# Patient Record
Sex: FEMALE | Race: WHITE | Employment: OTHER | URBAN - METROPOLITAN AREA
[De-identification: names, ages, dates, MRNs, and addresses within clinical notes are randomized per-mention and may not be internally consistent; named-entity substitution may affect disease eponyms.]

---

## 2017-01-20 ENCOUNTER — HOSPITAL ENCOUNTER (OUTPATIENT)
Dept: MAMMOGRAPHY | Age: 72
Discharge: HOME | End: 2017-01-20
Attending: NURSE PRACTITIONER
Payer: MEDICARE

## 2017-01-20 DIAGNOSIS — Z12.31 VISIT FOR SCREENING MAMMOGRAM: ICD-10-CM

## 2017-01-20 PROCEDURE — 77067 SCR MAMMO BI INCL CAD: CPT

## 2018-02-23 ENCOUNTER — HOSPITAL ENCOUNTER (OUTPATIENT)
Dept: MAMMOGRAPHY | Age: 73
Discharge: HOME | End: 2018-02-23
Attending: OBSTETRICS & GYNECOLOGY
Payer: MEDICARE

## 2018-02-23 DIAGNOSIS — Z12.31 VISIT FOR SCREENING MAMMOGRAM: ICD-10-CM

## 2018-02-23 PROCEDURE — 77067 SCR MAMMO BI INCL CAD: CPT

## 2018-11-01 ENCOUNTER — HOSPITAL ENCOUNTER (OUTPATIENT)
Dept: LAB | Age: 73
Discharge: HOME | End: 2018-11-01

## 2018-11-01 PROCEDURE — 88305 TISSUE EXAM BY PATHOLOGIST: CPT

## 2018-11-01 PROCEDURE — 88312 SPECIAL STAINS GROUP 1: CPT

## 2019-02-25 ENCOUNTER — HOSPITAL ENCOUNTER (OUTPATIENT)
Dept: MAMMOGRAPHY | Age: 74
Discharge: HOME | End: 2019-02-25
Attending: OBSTETRICS & GYNECOLOGY
Payer: MEDICARE

## 2019-02-25 DIAGNOSIS — Z12.31 VISIT FOR SCREENING MAMMOGRAM: ICD-10-CM

## 2019-02-25 PROCEDURE — 77067 SCR MAMMO BI INCL CAD: CPT

## 2020-03-04 ENCOUNTER — HOSPITAL ENCOUNTER (OUTPATIENT)
Dept: MAMMOGRAPHY | Age: 75
Discharge: HOME | End: 2020-03-04
Attending: NURSE PRACTITIONER
Payer: MEDICARE

## 2020-03-04 DIAGNOSIS — Z78.0 ASYMPTOMATIC MENOPAUSAL STATE: ICD-10-CM

## 2020-03-04 DIAGNOSIS — Z12.31 VISIT FOR SCREENING MAMMOGRAM: ICD-10-CM

## 2020-03-04 PROCEDURE — 77063 BREAST TOMOSYNTHESIS BI: CPT

## 2020-03-04 PROCEDURE — 77080 DXA BONE DENSITY AXIAL: CPT

## 2020-10-14 ENCOUNTER — HOSPITAL ENCOUNTER (OUTPATIENT)
Dept: LAB | Age: 75
Discharge: HOME | End: 2020-10-14

## 2020-10-14 PROCEDURE — 88305 TISSUE EXAM BY PATHOLOGIST: CPT

## 2020-12-08 ENCOUNTER — TRANSCRIBE ORDER (OUTPATIENT)
Dept: SCHEDULING | Age: 75
End: 2020-12-08

## 2020-12-08 DIAGNOSIS — Z12.31 SCREENING MAMMOGRAM FOR HIGH-RISK PATIENT: Primary | ICD-10-CM

## 2020-12-08 DIAGNOSIS — Z12.31 ENCOUNTER FOR SCREENING MAMMOGRAM FOR MALIGNANT NEOPLASM OF BREAST: Primary | ICD-10-CM

## 2021-04-12 ENCOUNTER — HOSPITAL ENCOUNTER (OUTPATIENT)
Dept: MAMMOGRAPHY | Age: 76
Discharge: HOME | End: 2021-04-12
Attending: NURSE PRACTITIONER
Payer: MEDICARE

## 2021-04-12 DIAGNOSIS — Z12.31 ENCOUNTER FOR SCREENING MAMMOGRAM FOR MALIGNANT NEOPLASM OF BREAST: ICD-10-CM

## 2021-04-12 PROCEDURE — 77063 BREAST TOMOSYNTHESIS BI: CPT

## 2021-08-09 ENCOUNTER — TRANSCRIBE ORDER (OUTPATIENT)
Dept: SCHEDULING | Age: 76
End: 2021-08-09

## 2021-08-09 DIAGNOSIS — K57.30 DVRTCLOS OF LG INT W/O PERFORATION OR ABSCESS W/O BLEEDING: Primary | ICD-10-CM

## 2021-08-09 DIAGNOSIS — R10.32 LLQ PAIN: ICD-10-CM

## 2021-08-09 DIAGNOSIS — R63.4 WEIGHT LOSS: ICD-10-CM

## 2021-08-20 ENCOUNTER — HOSPITAL ENCOUNTER (OUTPATIENT)
Dept: CT IMAGING | Age: 76
Discharge: HOME | End: 2021-08-20
Attending: INTERNAL MEDICINE
Payer: MEDICARE

## 2021-08-20 DIAGNOSIS — R63.4 WEIGHT LOSS: ICD-10-CM

## 2021-08-20 DIAGNOSIS — R10.32 LLQ PAIN: ICD-10-CM

## 2021-08-20 DIAGNOSIS — K57.30 DVRTCLOS OF LG INT W/O PERFORATION OR ABSCESS W/O BLEEDING: ICD-10-CM

## 2021-08-20 PROCEDURE — 74011000636 HC RX REV CODE- 636: Performed by: INTERNAL MEDICINE

## 2021-08-20 PROCEDURE — 74176 CT ABD & PELVIS W/O CONTRAST: CPT

## 2021-08-20 RX ADMIN — DIATRIZOATE MEGLUMINE AND DIATRIZOATE SODIUM 15 ML: 660; 100 LIQUID ORAL; RECTAL at 10:28

## 2021-08-31 ENCOUNTER — TRANSCRIBE ORDER (OUTPATIENT)
Dept: REGISTRATION | Age: 76
End: 2021-08-31

## 2021-08-31 DIAGNOSIS — Q61.00 CONGENITAL RENAL CYST: Primary | ICD-10-CM

## 2021-09-14 ENCOUNTER — HOSPITAL ENCOUNTER (OUTPATIENT)
Dept: CT IMAGING | Age: 76
Discharge: HOME | End: 2021-09-14
Attending: INTERNAL MEDICINE
Payer: MEDICARE

## 2021-09-14 DIAGNOSIS — Q61.00 CONGENITAL RENAL CYST: ICD-10-CM

## 2021-09-14 LAB — CREAT BLD-MCNC: 0.66 MG/DL (ref 0.8–1.5)

## 2021-09-14 PROCEDURE — 82565 ASSAY OF CREATININE: CPT

## 2021-09-14 PROCEDURE — 74011000258 HC RX REV CODE- 258: Performed by: INTERNAL MEDICINE

## 2021-09-14 PROCEDURE — 74170 CT ABD WO CNTRST FLWD CNTRST: CPT

## 2021-09-14 PROCEDURE — 74011000636 HC RX REV CODE- 636: Performed by: INTERNAL MEDICINE

## 2021-09-14 RX ORDER — SODIUM CHLORIDE 0.9 % (FLUSH) 0.9 %
10 SYRINGE (ML) INJECTION
Status: COMPLETED | OUTPATIENT
Start: 2021-09-14 | End: 2021-09-14

## 2021-09-14 RX ADMIN — SODIUM CHLORIDE 100 ML: 900 INJECTION, SOLUTION INTRAVENOUS at 09:35

## 2021-09-14 RX ADMIN — Medication 10 ML: at 09:35

## 2021-09-14 RX ADMIN — IOPAMIDOL 100 ML: 755 INJECTION, SOLUTION INTRAVENOUS at 09:34

## 2021-10-06 ENCOUNTER — TRANSCRIBE ORDER (OUTPATIENT)
Dept: SCHEDULING | Age: 76
End: 2021-10-06

## 2021-10-06 DIAGNOSIS — R10.32 LEFT LOWER QUADRANT PAIN: Primary | ICD-10-CM

## 2021-10-06 DIAGNOSIS — R11.0 NAUSEA: ICD-10-CM

## 2021-10-06 DIAGNOSIS — R93.429 ABNORMAL CT SCAN, KIDNEY: ICD-10-CM

## 2021-12-09 ENCOUNTER — TRANSCRIBE ORDER (OUTPATIENT)
Dept: SCHEDULING | Age: 76
End: 2021-12-09

## 2021-12-09 DIAGNOSIS — M85.80 OSTEOPENIA, UNSPECIFIED LOCATION: Primary | ICD-10-CM

## 2021-12-09 DIAGNOSIS — M81.0 OSTEOPOROSIS: ICD-10-CM

## 2021-12-09 DIAGNOSIS — Z13.820 OSTEOPOROSIS SCREENING: ICD-10-CM

## 2021-12-09 DIAGNOSIS — M85.80 OSTEOPENIA: ICD-10-CM

## 2021-12-09 DIAGNOSIS — M85.80 OSTEOPENIA, UNSPECIFIED LOCATION: ICD-10-CM

## 2021-12-09 DIAGNOSIS — Z12.31 BREAST CANCER SCREENING BY MAMMOGRAM: Primary | ICD-10-CM

## 2021-12-13 ENCOUNTER — TRANSCRIBE ORDER (OUTPATIENT)
Dept: SCHEDULING | Age: 76
End: 2021-12-13

## 2021-12-13 DIAGNOSIS — M81.0 POSTMENOPAUSAL OSTEOPOROSIS: Primary | ICD-10-CM

## 2022-04-14 ENCOUNTER — HOSPITAL ENCOUNTER (OUTPATIENT)
Dept: MAMMOGRAPHY | Age: 77
Discharge: HOME | End: 2022-04-14
Attending: NURSE PRACTITIONER
Payer: MEDICARE

## 2022-04-14 DIAGNOSIS — M81.0 POSTMENOPAUSAL OSTEOPOROSIS: ICD-10-CM

## 2022-04-14 DIAGNOSIS — Z12.31 BREAST CANCER SCREENING BY MAMMOGRAM: ICD-10-CM

## 2022-04-14 PROCEDURE — 77080 DXA BONE DENSITY AXIAL: CPT

## 2022-04-14 PROCEDURE — 77063 BREAST TOMOSYNTHESIS BI: CPT

## 2022-04-25 ENCOUNTER — HOSPITAL ENCOUNTER (OUTPATIENT)
Dept: MAMMOGRAPHY | Age: 77
Discharge: HOME | End: 2022-04-25
Attending: NURSE PRACTITIONER
Payer: MEDICARE

## 2022-04-25 DIAGNOSIS — R92.8 ABNORMAL MAMMOGRAM: ICD-10-CM

## 2022-04-25 PROCEDURE — 77061 BREAST TOMOSYNTHESIS UNI: CPT

## 2022-08-02 ENCOUNTER — OFFICE VISIT (OUTPATIENT)
Dept: UROLOGY | Age: 77
End: 2022-08-02
Payer: MEDICARE

## 2022-08-02 DIAGNOSIS — N36.2 URETHRAL CARUNCLE: Primary | ICD-10-CM

## 2022-08-02 DIAGNOSIS — N28.1 CYST OF KIDNEY, ACQUIRED: ICD-10-CM

## 2022-08-02 LAB
BILIRUBIN, URINE, POC: NEGATIVE
BLOOD URINE, POC: NEGATIVE
GLUCOSE URINE, POC: NEGATIVE
KETONES, URINE, POC: NEGATIVE
LEUKOCYTE ESTERASE, URINE, POC: NORMAL
NITRITE, URINE, POC: NEGATIVE
PH, URINE, POC: 6.5 (ref 4.6–8)
PROTEIN,URINE, POC: NEGATIVE
SPECIFIC GRAVITY, URINE, POC: 1.02 (ref 1–1.03)
URINALYSIS CLARITY, POC: NORMAL
URINALYSIS COLOR, POC: NORMAL
UROBILINOGEN, POC: NORMAL

## 2022-08-02 PROCEDURE — 99213 OFFICE O/P EST LOW 20 MIN: CPT | Performed by: NURSE PRACTITIONER

## 2022-08-02 PROCEDURE — G8399 PT W/DXA RESULTS DOCUMENT: HCPCS | Performed by: NURSE PRACTITIONER

## 2022-08-02 PROCEDURE — 4004F PT TOBACCO SCREEN RCVD TLK: CPT | Performed by: NURSE PRACTITIONER

## 2022-08-02 PROCEDURE — 1123F ACP DISCUSS/DSCN MKR DOCD: CPT | Performed by: NURSE PRACTITIONER

## 2022-08-02 PROCEDURE — 81003 URINALYSIS AUTO W/O SCOPE: CPT | Performed by: NURSE PRACTITIONER

## 2022-08-02 PROCEDURE — G8428 CUR MEDS NOT DOCUMENT: HCPCS | Performed by: NURSE PRACTITIONER

## 2022-08-02 PROCEDURE — G8421 BMI NOT CALCULATED: HCPCS | Performed by: NURSE PRACTITIONER

## 2022-08-02 PROCEDURE — 1090F PRES/ABSN URINE INCON ASSESS: CPT | Performed by: NURSE PRACTITIONER

## 2022-08-02 RX ORDER — AZELASTINE 1 MG/ML
SPRAY, METERED NASAL
COMMUNITY
Start: 2021-11-04

## 2022-08-02 ASSESSMENT — ENCOUNTER SYMPTOMS
NAUSEA: 0
BACK PAIN: 0

## 2022-08-02 NOTE — PROGRESS NOTES
[address], [address]  [phone]          Devyn Ceja  : 1945    Chief Complaint   Patient presents with    Follow-up          HPI     Devyn Ceja is a 68 y.o. female Returns today for follow-up on urethral caruncle and feeling of urinary infection. Patient continues to use Estrace cream as prescribed. She is without any urinary symptoms today. From a urinary standpoint she has been doing well. Her urine is clear today. She will need refills on Estrace     She is pleased with her urinary status. Is getting up once a night now to void however reviewing her medication she is taking the new medication and bedtime and needing to drink water with this. She saw Dr. Jeison Marcum last year after her visit with me to discuss renal cyst.  She does not need any follow up Imaging on that. Had pneumonia earlier this year, but it was not covid related.      Past Medical History:   Diagnosis Date    Endocrine disorder     GERD (gastroesophageal reflux disease)     Hypercholesterolemia     Hypertension     Menopause     Mitral valve prolapse     Urethral caruncle     HORACIO III (vulvar intraepithelial neoplasia III)     Laser treatment at 5:00 area; followed with Dr. Lynn Wagner     Past Surgical History:   Procedure Laterality Date    GYN  2011    laser of HORACIO III with Dr. Ana Paula Nolan (has both ovaries)     Current Outpatient Medications   Medication Sig Dispense Refill    azelastine (ASTELIN) 0.1 % nasal spray       acyclovir (ZOVIRAX) 5 % CREA Apply topically 5 times daily      ascorbic acid (VITAMIN C) 250 MG tablet Take by mouth      aspirin 81 MG EC tablet Take 81 mg by mouth daily      atorvastatin (LIPITOR) 10 MG tablet Take by mouth daily (Patient not taking: Reported on 2022)      betamethasone dipropionate (DIPROLENE) 0.05 % ointment Apply topically 2 times daily      bisoprolol (ZEBETA) 10 MG tablet Take 10 mg by mouth daily      bisoprolol-hydroCHLOROthiazide (ZIAC) 2.5-6.25 MG per tablet Take 1 tablet by mouth daily (Patient not taking: Reported on 8/2/2022)      Calcium Carbonate-Vitamin D (CALCIUM-VITAMIN D) 600-125 MG-UNIT TABS Take by mouth      cetirizine (ZYRTEC) 10 MG tablet Take by mouth daily (Patient not taking: Reported on 8/2/2022)      cycloSPORINE (RESTASIS) 0.05 % ophthalmic emulsion Apply 1 drop to eye 2 times daily      diclofenac sodium (VOLTAREN) 1 % GEL Apply 2 gm BID to affected area (Patient not taking: Reported on 8/2/2022)      estradiol (ESTRACE) 0.1 MG/GM vaginal cream Use one gram 2 times per week in vagina      famotidine (PEPCID) 20 MG tablet Take 20 mg by mouth 2 times daily      levocetirizine (XYZAL) 5 MG tablet Take by mouth      losartan (COZAAR) 100 MG tablet Take 100 mg by mouth daily      triamcinolone (KENALOG) 0.1 % cream Apply topically 2 times daily       No current facility-administered medications for this visit.      Allergies   Allergen Reactions    Latex Other (See Comments)    Methylprednisolone Other (See Comments) and Shortness Of Breath     flushing    Erythromycin Nausea Only    Penicillins Rash    Sulfa Antibiotics Nausea And Vomiting     Social History     Socioeconomic History    Marital status:      Spouse name: Not on file    Number of children: Not on file    Years of education: Not on file    Highest education level: Not on file   Occupational History    Not on file   Tobacco Use    Smoking status: Never    Smokeless tobacco: Never   Substance and Sexual Activity    Alcohol use: No    Drug use: No    Sexual activity: Not on file   Other Topics Concern    Not on file   Social History Narrative    Not on file     Social Determinants of Health     Financial Resource Strain: Not on file   Food Insecurity: Not on file   Transportation Needs: Not on file   Physical Activity: Not on file   Stress: Not on file   Social Connections: Not on file   Intimate Partner Violence: Not on file   Housing Stability: Not on file     Family History   Problem Relation Age of Onset    Hypertension Mother     Breast Cancer Neg Hx     Hypertension Father     High Cholesterol Brother     Heart Disease Brother     Hypertension Brother     Stroke Father     Cancer Mother         cervical    High Cholesterol Father     Heart Disease Father        Review of Systems  Constitutional:   Negative for fever. GI:  Negative for nausea. Musculoskeletal:  Negative for back pain. Urinalysis  UA - Dipstick  Results for orders placed or performed in visit on 08/02/22   AMB POC URINALYSIS DIP STICK AUTO W/O MICRO   Result Value Ref Range    Color (UA POC)      Clarity (UA POC)      Glucose, Urine, POC Negative Negative    Bilirubin, Urine, POC Negative Negative    KETONES, Urine, POC Negative Negative    Specific Gravity, Urine, POC 1.020 1.001 - 1.035    Blood (UA POC) Negative Negative    pH, Urine, POC 6.5 4.6 - 8.0    Protein, Urine, POC Negative Negative    Urobilinogen, POC 0.2 mg/dL     Nitrite, Urine, POC Negative Negative    Leukocyte Esterase, Urine, POC Small Negative       UA - Micro  WBC - 0  RBC - 0  Bacteria - 0  Epith - 0    PHYSICAL EXAM    GENERAL: No acute distress, Awake, Alert, Oriented X 3, Gait normal  ABDOMEN: soft, non tender, non-distended, positive bowel sounds, no organomegaly, no palpable masses, no guarding, no rebound tenderness  SKIN: No rash, no erythema, no lacerations or abrasions, no ecchymosis  MUSCULOSKELETAL - MAEW, no edema     Assessment and Plan    ICD-10-CM    1. Urethral caruncle  N36.2       2. Cyst of kidney, acquired  N28.1 AMB POC URINALYSIS DIP STICK AUTO W/O MICRO        PLAN:  Estrace refills sent to pharmacy. Follow up in 1 year  Sooner appt for follow up if any symptoms occur    Bunny Meléndez, NP, APRN - NP  Dr. Estuardo Kenny is supervising physician today and he approves plan of care. Return in about 1 year (around 8/2/2023) for for recheck.     Elements of this note have been dictated using speech recognition software. Although reviewed, errors of speech recognition may have occurred.

## 2022-08-09 ENCOUNTER — TELEPHONE (OUTPATIENT)
Dept: UROLOGY | Age: 77
End: 2022-08-09

## 2022-08-09 RX ORDER — ESTRADIOL 0.1 MG/G
CREAM VAGINAL
Qty: 127.5 G | Refills: 3 | Status: SHIPPED | OUTPATIENT
Start: 2022-08-09

## 2023-04-26 ENCOUNTER — HOSPITAL ENCOUNTER (OUTPATIENT)
Dept: MAMMOGRAPHY | Age: 78
Discharge: HOME | End: 2023-04-29
Payer: MEDICARE

## 2023-04-26 DIAGNOSIS — Z12.31 ENCOUNTER FOR SCREENING MAMMOGRAM FOR MALIGNANT NEOPLASM OF BREAST: ICD-10-CM

## 2023-04-26 PROCEDURE — 77067 SCR MAMMO BI INCL CAD: CPT

## 2023-08-02 ENCOUNTER — TELEPHONE (OUTPATIENT)
Dept: UROLOGY | Age: 78
End: 2023-08-02

## 2023-08-02 ENCOUNTER — OFFICE VISIT (OUTPATIENT)
Dept: UROLOGY | Age: 78
End: 2023-08-02
Payer: MEDICARE

## 2023-08-02 DIAGNOSIS — N36.2 URETHRAL CARUNCLE: Primary | ICD-10-CM

## 2023-08-02 DIAGNOSIS — K72.90 ATROPHY OF LIVER (HCC): ICD-10-CM

## 2023-08-02 LAB
BILIRUBIN, URINE, POC: NEGATIVE
BLOOD URINE, POC: NEGATIVE
GLUCOSE URINE, POC: NEGATIVE
KETONES, URINE, POC: NEGATIVE
LEUKOCYTE ESTERASE, URINE, POC: NEGATIVE
NITRITE, URINE, POC: NEGATIVE
PH, URINE, POC: 7 (ref 4.6–8)
PROTEIN,URINE, POC: NEGATIVE
SPECIFIC GRAVITY, URINE, POC: 1.01 (ref 1–1.03)
URINALYSIS CLARITY, POC: NORMAL
URINALYSIS COLOR, POC: NORMAL
UROBILINOGEN, POC: NORMAL

## 2023-08-02 PROCEDURE — G8399 PT W/DXA RESULTS DOCUMENT: HCPCS | Performed by: NURSE PRACTITIONER

## 2023-08-02 PROCEDURE — G8421 BMI NOT CALCULATED: HCPCS | Performed by: NURSE PRACTITIONER

## 2023-08-02 PROCEDURE — G8428 CUR MEDS NOT DOCUMENT: HCPCS | Performed by: NURSE PRACTITIONER

## 2023-08-02 PROCEDURE — 1090F PRES/ABSN URINE INCON ASSESS: CPT | Performed by: NURSE PRACTITIONER

## 2023-08-02 PROCEDURE — 99213 OFFICE O/P EST LOW 20 MIN: CPT | Performed by: NURSE PRACTITIONER

## 2023-08-02 PROCEDURE — 1036F TOBACCO NON-USER: CPT | Performed by: NURSE PRACTITIONER

## 2023-08-02 PROCEDURE — 1123F ACP DISCUSS/DSCN MKR DOCD: CPT | Performed by: NURSE PRACTITIONER

## 2023-08-02 PROCEDURE — 81003 URINALYSIS AUTO W/O SCOPE: CPT | Performed by: NURSE PRACTITIONER

## 2023-08-02 RX ORDER — ESTRADIOL 0.1 MG/G
CREAM VAGINAL
Qty: 127.5 G | Refills: 3 | Status: SHIPPED | OUTPATIENT
Start: 2023-08-02

## 2023-08-02 RX ORDER — ESTRADIOL 0.5 MG/1
TABLET ORAL
COMMUNITY
Start: 2023-05-10

## 2023-08-02 ASSESSMENT — ENCOUNTER SYMPTOMS
NAUSEA: 0
BACK PAIN: 0

## 2023-08-02 NOTE — PROGRESS NOTES
Father     Heart Disease Father        Review of Systems  Constitutional:   Negative for fever. GI:  Negative for nausea. Musculoskeletal:  Negative for back pain. Urinalysis  UA - Dipstick  Results for orders placed or performed in visit on 23   AMB POC URINALYSIS DIP STICK AUTO W/O MICRO   Result Value Ref Range    Color (UA POC)      Clarity (UA POC)      Glucose, Urine, POC Negative Negative    Bilirubin, Urine, POC Negative Negative    KETONES, Urine, POC Negative Negative    Specific Gravity, Urine, POC 1.015 1.001 - 1.035    Blood (UA POC) Negative Negative    pH, Urine, POC 7.0 4.6 - 8.0    Protein, Urine, POC Negative Negative    Urobilinogen, POC 0.2 mg/dL     Nitrite, Urine, POC Negative Negative    Leukocyte Esterase, Urine, POC Negative Negative     PHYSICAL EXAM    GENERAL: No acute distress, Awake, Alert, Oriented X 3, Gait normal  ABDOMEN: soft, non tender, non-distended, positive bowel sounds, no organomegaly, no palpable masses, no guarding, no rebound tenderness  SKIN: No rash, no erythema, no lacerations or abrasions, no ecchymosis  MUSCULOSKELETAL - MAEW, no edema     Assessment and Plan    ICD-10-CM    1. Urethral caruncle  N36.2 AMB POC URINALYSIS DIP STICK AUTO W/O MICRO      2. Atrophy of liver ([address])  K72.90 External Referral To Gastroenterology        Urethral caruncle-  She is not having any symptoms with caruncle. She will continue Estrace vaginal cream.    Liver atrophy seen in CT 2 year ago.  Pt requesting referral to GI in [place]    Orders Placed This Encounter    External Referral To Gastroenterology     Referral Priority:   Routine     Referral Type:   Eval and Treat     Referral Reason:   Specialty Services Required     Requested Specialty:   Gastroenterology     Number of Visits Requested:   1    AMB POC URINALYSIS DIP STICK AUTO W/O MICRO    estradiol (ESTRACE) 0.5 MG tablet    estradiol (ESTRACE) 0.1 MG/GM vaginal cream     Si.5Use one gram 2 times per week in

## 2023-08-02 NOTE — TELEPHONE ENCOUNTER
[facility] [place] [facility] [place]. Patient had abnormal CT showing atrophy of the liver. Referral was placed.     Thank you,   Gabriel Samayoa

## 2024-01-08 ENCOUNTER — OFFICE VISIT (OUTPATIENT)
Dept: ORTHOPEDIC SURGERY | Age: 79
End: 2024-01-08
Payer: MEDICARE

## 2024-01-08 VITALS — WEIGHT: 164 LBS | HEIGHT: 67 IN | BODY MASS INDEX: 25.74 KG/M2

## 2024-01-08 DIAGNOSIS — M41.50 DEGENERATIVE SCOLIOSIS IN ADULT PATIENT: ICD-10-CM

## 2024-01-08 DIAGNOSIS — M70.62 TROCHANTERIC BURSITIS OF LEFT HIP: ICD-10-CM

## 2024-01-08 DIAGNOSIS — M50.30 DDD (DEGENERATIVE DISC DISEASE), CERVICAL: ICD-10-CM

## 2024-01-08 DIAGNOSIS — M51.34 DDD (DEGENERATIVE DISC DISEASE), THORACIC: Primary | ICD-10-CM

## 2024-01-08 PROCEDURE — 1090F PRES/ABSN URINE INCON ASSESS: CPT | Performed by: PHYSICIAN ASSISTANT

## 2024-01-08 PROCEDURE — 1036F TOBACCO NON-USER: CPT | Performed by: PHYSICIAN ASSISTANT

## 2024-01-08 PROCEDURE — G8427 DOCREV CUR MEDS BY ELIG CLIN: HCPCS | Performed by: PHYSICIAN ASSISTANT

## 2024-01-08 PROCEDURE — 99204 OFFICE O/P NEW MOD 45 MIN: CPT | Performed by: PHYSICIAN ASSISTANT

## 2024-01-08 PROCEDURE — G8484 FLU IMMUNIZE NO ADMIN: HCPCS | Performed by: PHYSICIAN ASSISTANT

## 2024-01-08 PROCEDURE — G8399 PT W/DXA RESULTS DOCUMENT: HCPCS | Performed by: PHYSICIAN ASSISTANT

## 2024-01-08 PROCEDURE — G8419 CALC BMI OUT NRM PARAM NOF/U: HCPCS | Performed by: PHYSICIAN ASSISTANT

## 2024-01-08 PROCEDURE — 1123F ACP DISCUSS/DSCN MKR DOCD: CPT | Performed by: PHYSICIAN ASSISTANT

## 2024-01-08 RX ORDER — MULTIVITAMIN WITH IRON
1 TABLET ORAL DAILY
COMMUNITY

## 2024-01-08 RX ORDER — ACYCLOVIR 50 MG/G
OINTMENT TOPICAL
COMMUNITY
Start: 2023-11-06

## 2024-01-08 RX ORDER — NYSTATIN 100000 U/G
1 CREAM TOPICAL DAILY
COMMUNITY
Start: 2023-08-18

## 2024-01-08 RX ORDER — MONTELUKAST SODIUM 10 MG/1
10 TABLET ORAL NIGHTLY
COMMUNITY
Start: 2023-11-15

## 2024-01-08 RX ORDER — KETOCONAZOLE 20 MG/ML
SHAMPOO TOPICAL
COMMUNITY
Start: 2023-11-08

## 2024-01-08 RX ORDER — OLOPATADINE HYDROCHLORIDE 1 MG/ML
1 SOLUTION/ DROPS OPHTHALMIC
COMMUNITY

## 2024-01-08 NOTE — PROGRESS NOTES
Name: Sarah Gregg  YOB: 1945  Gender: female  MRN: [id number]    CC: Shoulder Pain (Bilateral stiffness in shoulders)       HPI: This is a 78 y.o. year old female who is here today to evaluate and balance of her shoulders.  She has noticed that her right shoulder is lower than the left and sometimes I just feel heavy has had some discomfort in her upper shoulders.  She has noticed some rotation for thorax.  Her PCP did order some x-rays.  She is also having a left lateral hip pain.  There is no groin pain.  She denies pain radiating into her arms.  Denies numbness and tingling of the upper extremities.  She is not dropping objects.  She has been doing physical therapy for 2 weeks and does feel that this has helped with some of her symptoms.    History was obtained by patient    This patient  has not had lumbar surgery in the past.                1/8/2024    12:01 PM   AMB PAIN ASSESSMENT   Location of Pain Shoulder   Location Modifiers Right;Left   Quality of Pain Dull   Duration of Pain Persistent   Frequency of Pain Constant   Date Pain First Started 3/9/2022   Result of Injury No   Work-Related Injury No   Are there other pain locations you wish to document? No            ROS/Meds/PSH/PMH/FH/SH: I personally reviewed the patient's collected intake data.  Below are the pertinents:    Allergies   Allergen Reactions    Latex Other (See Comments)    Methylprednisolone Other (See Comments) and Shortness Of Breath     flushing    Erythromycin Nausea Only    Penicillins Rash    Sulfa Antibiotics Nausea And Vomiting         Current Outpatient Medications:     magnesium (MAGNESIUM-OXIDE) 250 MG TABS tablet, Take 1 tablet by mouth daily, Disp: , Rfl:     MAGNESIUM PO, Take by mouth, Disp: , Rfl:     metroNIDAZOLE (METROCREAM) 0.75 % cream, daily, Disp: , Rfl:     montelukast (SINGULAIR) 10 MG tablet, Take 1 tablet by mouth nightly, Disp: , Rfl:     olopatadine (PATANOL) 0.1 % ophthalmic solution,

## 2024-02-15 ENCOUNTER — OFFICE VISIT (OUTPATIENT)
Dept: OBGYN CLINIC | Age: 79
End: 2024-02-15
Payer: MEDICARE

## 2024-02-15 VITALS — BODY MASS INDEX: 25.06 KG/M2 | DIASTOLIC BLOOD PRESSURE: 90 MMHG | SYSTOLIC BLOOD PRESSURE: 178 MMHG | WEIGHT: 160 LBS

## 2024-02-15 DIAGNOSIS — M89.9 BONE DISORDER: ICD-10-CM

## 2024-02-15 DIAGNOSIS — Z87.39 HISTORY OF OSTEOPENIA: ICD-10-CM

## 2024-02-15 DIAGNOSIS — Z12.31 OTHER SCREENING MAMMOGRAM: ICD-10-CM

## 2024-02-15 DIAGNOSIS — M89.9 DISORDER OF BONE AND CARTILAGE: ICD-10-CM

## 2024-02-15 DIAGNOSIS — M94.9 DISORDER OF BONE AND CARTILAGE: ICD-10-CM

## 2024-02-15 DIAGNOSIS — N89.8 VAGINAL MASS: Primary | ICD-10-CM

## 2024-02-15 DIAGNOSIS — M81.0 AGE-RELATED OSTEOPOROSIS WITHOUT CURRENT PATHOLOGICAL FRACTURE: ICD-10-CM

## 2024-02-15 PROCEDURE — G8399 PT W/DXA RESULTS DOCUMENT: HCPCS | Performed by: OBSTETRICS & GYNECOLOGY

## 2024-02-15 PROCEDURE — 1123F ACP DISCUSS/DSCN MKR DOCD: CPT | Performed by: OBSTETRICS & GYNECOLOGY

## 2024-02-15 PROCEDURE — 99203 OFFICE O/P NEW LOW 30 MIN: CPT | Performed by: OBSTETRICS & GYNECOLOGY

## 2024-02-15 PROCEDURE — 1090F PRES/ABSN URINE INCON ASSESS: CPT | Performed by: OBSTETRICS & GYNECOLOGY

## 2024-02-15 PROCEDURE — 1036F TOBACCO NON-USER: CPT | Performed by: OBSTETRICS & GYNECOLOGY

## 2024-02-15 PROCEDURE — G8419 CALC BMI OUT NRM PARAM NOF/U: HCPCS | Performed by: OBSTETRICS & GYNECOLOGY

## 2024-02-15 PROCEDURE — G8427 DOCREV CUR MEDS BY ELIG CLIN: HCPCS | Performed by: OBSTETRICS & GYNECOLOGY

## 2024-02-15 PROCEDURE — G8484 FLU IMMUNIZE NO ADMIN: HCPCS | Performed by: OBSTETRICS & GYNECOLOGY

## 2024-02-15 NOTE — PROGRESS NOTES
Name: Sarah Gregg  Date: 2/15/2024  YOB: 1945  LMP: No LMP recorded. Patient has had a hysterectomy.      Sarah is a 78 y.o.   who is here for a new patient.  Dr Nguyen Dx her with vulvar cancer in .  She has been seen by Dr Cordoba since even though she has been cancer free.  In February she was complaining of a swollen vagina and irritation of the vulva.  He suggested she find a gen gyn.     Birth control: postmenopausal  Menstrual status: none  Gyn Surgery: s/p TVH and laser of HORACIO III    Health Maintenance:  Pap Smear: last pap in   WNL  If 40 or older, Mammo: last mammo in 23, pathology Negative  If 45 or older, Colonoscopy:  due in   If postmenopausal, Dexa scan:  22 \"Low\"    Review of Systems   Constitutional: Negative.    HENT: Negative.     Eyes: Negative.    Respiratory: Negative.     Cardiovascular: Negative.    Gastrointestinal: Negative.    Endocrine: Negative.    Genitourinary: Negative.    Musculoskeletal: Negative.    Skin: Negative.    Allergic/Immunologic: Negative.    Hematological: Negative.    Psychiatric/Behavioral: Negative.  Negative for self-injury and suicidal ideas.         Past Medical History:  No date: Bursitis  No date: Eczema  No date: Endocrine disorder  No date: GERD (gastroesophageal reflux disease)  No date: Hypercholesterolemia  No date: Hypertension  No date: Menopause  No date: Mitral valve prolapse  No date: Scoliosis  No date: Urethral caruncle  : HORACIO III (vulvar intraepithelial neoplasia III)      Comment:  Laser treatment at 5:00 area; followed with Dr. Cordoba     Past Surgical History:  2011: GYN      Comment:  laser of HORACIO III with Dr. Cordoba  1986: HYSTERECTOMY (CERVIX STATUS UNKNOWN)      Comment:  TVH (has both ovaries)       Current Outpatient Medications:     NONFORMULARY, Garcia compounding cream, Disp: , Rfl:     ketoconazole (NIZORAL) 2 % shampoo, , Disp: , Rfl:     magnesium (MAGNESIUM-OXIDE) 250 MG TABS tablet,

## 2024-02-19 ENCOUNTER — OFFICE VISIT (OUTPATIENT)
Dept: ORTHOPEDIC SURGERY | Age: 79
End: 2024-02-19
Payer: MEDICARE

## 2024-02-19 DIAGNOSIS — M54.16 LUMBAR RADICULOPATHY: ICD-10-CM

## 2024-02-19 DIAGNOSIS — M70.62 TROCHANTERIC BURSITIS OF LEFT HIP: Primary | ICD-10-CM

## 2024-02-19 PROCEDURE — G8399 PT W/DXA RESULTS DOCUMENT: HCPCS | Performed by: PHYSICIAN ASSISTANT

## 2024-02-19 PROCEDURE — 1090F PRES/ABSN URINE INCON ASSESS: CPT | Performed by: PHYSICIAN ASSISTANT

## 2024-02-19 PROCEDURE — G8428 CUR MEDS NOT DOCUMENT: HCPCS | Performed by: PHYSICIAN ASSISTANT

## 2024-02-19 PROCEDURE — 99213 OFFICE O/P EST LOW 20 MIN: CPT | Performed by: PHYSICIAN ASSISTANT

## 2024-02-19 PROCEDURE — 1036F TOBACCO NON-USER: CPT | Performed by: PHYSICIAN ASSISTANT

## 2024-02-19 PROCEDURE — G8484 FLU IMMUNIZE NO ADMIN: HCPCS | Performed by: PHYSICIAN ASSISTANT

## 2024-02-19 PROCEDURE — 1123F ACP DISCUSS/DSCN MKR DOCD: CPT | Performed by: PHYSICIAN ASSISTANT

## 2024-02-19 PROCEDURE — G8419 CALC BMI OUT NRM PARAM NOF/U: HCPCS | Performed by: PHYSICIAN ASSISTANT

## 2024-02-19 NOTE — PROGRESS NOTES
Methylprednisolone Other (See Comments) and Shortness Of Breath     flushing    Shellfish-Derived Products Shortness Of Breath    Adhesive Tape Itching    Erythromycin Nausea Only    Parabens Swelling    Trimethoprim Nausea Only    Penicillins Rash    Sulfa Antibiotics Nausea And Vomiting         Current Outpatient Medications:     NONFORMULARY, Garcia compounding cream, Disp: , Rfl:     ketoconazole (NIZORAL) 2 % shampoo, , Disp: , Rfl:     magnesium (MAGNESIUM-OXIDE) 250 MG TABS tablet, Take 1 tablet by mouth daily, Disp: , Rfl:     metroNIDAZOLE (METROCREAM) 0.75 % cream, daily, Disp: , Rfl:     montelukast (SINGULAIR) 10 MG tablet, Take 1 tablet by mouth nightly, Disp: , Rfl:     olopatadine (PATANOL) 0.1 % ophthalmic solution, 1 drop once as needed, Disp: , Rfl:     acyclovir (ZOVIRAX) 5 % ointment, , Disp: , Rfl:     diclofenac sodium (VOLTAREN) 1 % GEL, Apply 4 g topically 4 times daily as needed for Pain, Disp: 150 g, Rfl: 2    estradiol (ESTRACE) 0.5 MG tablet, , Disp: , Rfl:     estradiol (ESTRACE) 0.1 MG/GM vaginal cream, 42.5Use one gram 2 times per week in vagina, Disp: 127.5 g, Rfl: 3    azelastine (ASTELIN) 0.1 % nasal spray, , Disp: , Rfl:     acyclovir (ZOVIRAX) 5 % CREA, Apply topically 5 times daily, Disp: , Rfl:     ascorbic acid (VITAMIN C) 250 MG tablet, Take by mouth, Disp: , Rfl:     betamethasone dipropionate (DIPROLENE) 0.05 % ointment, Apply topically 2 times daily, Disp: , Rfl:     Calcium Carbonate-Vitamin D (CALCIUM-VITAMIN D) 600-125 MG-UNIT TABS, Take by mouth, Disp: , Rfl:     cycloSPORINE (RESTASIS) 0.05 % ophthalmic emulsion, Apply 1 drop to eye 2 times daily, Disp: , Rfl:     levocetirizine (XYZAL) 5 MG tablet, Take by mouth, Disp: , Rfl:     losartan (COZAAR) 100 MG tablet, Take 1 tablet by mouth daily, Disp: , Rfl:     triamcinolone (KENALOG) 0.1 % cream, Apply topically 2 times daily, Disp: , Rfl:     Past Surgical History:   Procedure Laterality Date    GYN  2011    laser of

## 2024-04-11 ENCOUNTER — OFFICE VISIT (OUTPATIENT)
Dept: OBGYN CLINIC | Age: 79
End: 2024-04-11
Payer: MEDICARE

## 2024-04-11 VITALS
SYSTOLIC BLOOD PRESSURE: 138 MMHG | BODY MASS INDEX: 25.43 KG/M2 | DIASTOLIC BLOOD PRESSURE: 84 MMHG | WEIGHT: 162 LBS | HEIGHT: 67 IN

## 2024-04-11 DIAGNOSIS — N90.89 VULVAR IRRITATION: Primary | ICD-10-CM

## 2024-04-11 DIAGNOSIS — N95.2 VAGINAL ATROPHY: ICD-10-CM

## 2024-04-11 PROCEDURE — G8419 CALC BMI OUT NRM PARAM NOF/U: HCPCS | Performed by: OBSTETRICS & GYNECOLOGY

## 2024-04-11 PROCEDURE — G8399 PT W/DXA RESULTS DOCUMENT: HCPCS | Performed by: OBSTETRICS & GYNECOLOGY

## 2024-04-11 PROCEDURE — 1036F TOBACCO NON-USER: CPT | Performed by: OBSTETRICS & GYNECOLOGY

## 2024-04-11 PROCEDURE — 99213 OFFICE O/P EST LOW 20 MIN: CPT | Performed by: OBSTETRICS & GYNECOLOGY

## 2024-04-11 PROCEDURE — 1123F ACP DISCUSS/DSCN MKR DOCD: CPT | Performed by: OBSTETRICS & GYNECOLOGY

## 2024-04-11 PROCEDURE — 1090F PRES/ABSN URINE INCON ASSESS: CPT | Performed by: OBSTETRICS & GYNECOLOGY

## 2024-04-11 PROCEDURE — G8427 DOCREV CUR MEDS BY ELIG CLIN: HCPCS | Performed by: OBSTETRICS & GYNECOLOGY

## 2024-04-11 RX ORDER — ESTRADIOL 0.1 MG/G
CREAM VAGINAL
Qty: 425 EACH | Refills: 3 | Status: SHIPPED | OUTPATIENT
Start: 2024-04-11

## 2024-04-11 SDOH — ECONOMIC STABILITY: HOUSING INSECURITY
IN THE LAST 12 MONTHS, WAS THERE A TIME WHEN YOU DID NOT HAVE A STEADY PLACE TO SLEEP OR SLEPT IN A SHELTER (INCLUDING NOW)?: NO

## 2024-04-11 SDOH — ECONOMIC STABILITY: FOOD INSECURITY: WITHIN THE PAST 12 MONTHS, YOU WORRIED THAT YOUR FOOD WOULD RUN OUT BEFORE YOU GOT MONEY TO BUY MORE.: NEVER TRUE

## 2024-04-11 SDOH — ECONOMIC STABILITY: INCOME INSECURITY: HOW HARD IS IT FOR YOU TO PAY FOR THE VERY BASICS LIKE FOOD, HOUSING, MEDICAL CARE, AND HEATING?: NOT HARD AT ALL

## 2024-04-11 ASSESSMENT — ENCOUNTER SYMPTOMS
EYES NEGATIVE: 1
GASTROINTESTINAL NEGATIVE: 1
ALLERGIC/IMMUNOLOGIC NEGATIVE: 1
RESPIRATORY NEGATIVE: 1

## 2024-04-11 NOTE — PROGRESS NOTES
Name: Sarah rGegg  Date: 2024  YOB: 1945  LMP: No LMP recorded. Patient has had a hysterectomy.      Sarah is a 78 y.o.   who is here for a problem visit for round, raised, feel like \"wrinkles\" spot on L vulva . Hx of HORACIO III.     Birth control: postmenopausal  Menstrual status: none  Gyn Surgery: s/p TVH and laser of HORACIO III     Health Maintenance:  Pap Smear: last pap in   WNL  If 40 or older, Mammo: last mammo in 23, pathology Negative  If 45 or older, Colonoscopy:  due in   If postmenopausal, Dexa scan:  22 \"Low\"    Review of Systems   Constitutional: Negative.    HENT: Negative.     Eyes: Negative.    Respiratory: Negative.     Cardiovascular: Negative.    Gastrointestinal: Negative.    Endocrine: Negative.    Genitourinary: Negative.    Musculoskeletal: Negative.    Skin: Negative.    Allergic/Immunologic: Negative.    Hematological: Negative.    Psychiatric/Behavioral: Negative.  Negative for self-injury and suicidal ideas.       Past Medical History:  No date: Bursitis  No date: Eczema  No date: Endocrine disorder  No date: GERD (gastroesophageal reflux disease)  No date: Hypercholesterolemia  No date: Hypertension  No date: Menopause  No date: Mitral valve prolapse  No date: Scoliosis  No date: Urethral caruncle  : HORACIO III (vulvar intraepithelial neoplasia III)      Comment:  Laser treatment at 5:00 area; followed with Dr. Cordoba     Past Surgical History:  2011: GYN      Comment:  laser of HORACIO III with Dr. Cordoba  1986: HYSTERECTOMY (CERVIX STATUS UNKNOWN)      Comment:  TVH (has both ovaries)       Current Outpatient Medications:     NONFORMULARY, Garcia compounding cream, Disp: , Rfl:     ketoconazole (NIZORAL) 2 % shampoo, , Disp: , Rfl:     magnesium (MAGNESIUM-OXIDE) 250 MG TABS tablet, Take 1 tablet by mouth daily, Disp: , Rfl:     metroNIDAZOLE (METROCREAM) 0.75 % cream, daily, Disp: , Rfl:     montelukast (SINGULAIR) 10 MG tablet, Take 1 tablet

## 2024-04-17 ENCOUNTER — TELEPHONE (OUTPATIENT)
Dept: OBGYN CLINIC | Age: 79
End: 2024-04-17

## 2024-04-17 RX ORDER — ESTRADIOL 0.1 MG/G
CREAM VAGINAL
Qty: 42.5 G | Refills: 3 | Status: SHIPPED | OUTPATIENT
Start: 2024-04-17

## 2024-04-17 NOTE — TELEPHONE ENCOUNTER
[facility] LVM stating that they have received a rx for Sarah Gregg that has conflicting directions - in need of verification.    Ref# [id number]    Sent in new rx with correct info. Called [facility] to cancel old order and use new.

## 2024-04-29 ENCOUNTER — HOSPITAL ENCOUNTER (OUTPATIENT)
Dept: MAMMOGRAPHY | Age: 79
Discharge: HOME | End: 2024-05-02
Attending: OBSTETRICS & GYNECOLOGY
Payer: MEDICARE

## 2024-04-29 DIAGNOSIS — Z87.39 HISTORY OF OSTEOPENIA: ICD-10-CM

## 2024-04-29 DIAGNOSIS — Z12.31 OTHER SCREENING MAMMOGRAM: ICD-10-CM

## 2024-04-29 DIAGNOSIS — M89.9 DISORDER OF BONE AND CARTILAGE: ICD-10-CM

## 2024-04-29 DIAGNOSIS — M94.9 DISORDER OF BONE AND CARTILAGE: ICD-10-CM

## 2024-04-29 DIAGNOSIS — M81.0 AGE-RELATED OSTEOPOROSIS WITHOUT CURRENT PATHOLOGICAL FRACTURE: ICD-10-CM

## 2024-04-29 DIAGNOSIS — M89.9 BONE DISORDER: ICD-10-CM

## 2024-04-29 PROCEDURE — 77080 DXA BONE DENSITY AXIAL: CPT

## 2024-04-29 PROCEDURE — 77063 BREAST TOMOSYNTHESIS BI: CPT

## 2024-07-10 ENCOUNTER — OFFICE VISIT (OUTPATIENT)
Dept: OBGYN CLINIC | Age: 79
End: 2024-07-10

## 2024-07-10 VITALS
WEIGHT: 162 LBS | DIASTOLIC BLOOD PRESSURE: 72 MMHG | HEIGHT: 67 IN | SYSTOLIC BLOOD PRESSURE: 130 MMHG | BODY MASS INDEX: 25.43 KG/M2

## 2024-07-10 DIAGNOSIS — L29.2 VULVAR ITCHING: Primary | ICD-10-CM

## 2024-07-10 DIAGNOSIS — N90.89 VULVAR IRRITATION: ICD-10-CM

## 2024-07-10 RX ORDER — CLOTRIMAZOLE AND BETAMETHASONE DIPROPIONATE 10; .64 MG/G; MG/G
CREAM TOPICAL
Qty: 45 G | Refills: 2 | Status: SHIPPED | OUTPATIENT
Start: 2024-07-10

## 2024-07-10 RX ORDER — BISOPROLOL FUMARATE 10 MG/1
TABLET, FILM COATED ORAL
COMMUNITY
Start: 2022-09-04

## 2024-07-10 RX ORDER — HYDROCHLOROTHIAZIDE 12.5 MG/1
TABLET ORAL
COMMUNITY
Start: 2022-09-07

## 2024-07-10 RX ORDER — ATORVASTATIN CALCIUM 20 MG/1
TABLET, FILM COATED ORAL
COMMUNITY
Start: 2022-10-07

## 2024-07-10 SDOH — ECONOMIC STABILITY: INCOME INSECURITY: HOW HARD IS IT FOR YOU TO PAY FOR THE VERY BASICS LIKE FOOD, HOUSING, MEDICAL CARE, AND HEATING?: NOT VERY HARD

## 2024-07-10 SDOH — ECONOMIC STABILITY: FOOD INSECURITY: WITHIN THE PAST 12 MONTHS, YOU WORRIED THAT YOUR FOOD WOULD RUN OUT BEFORE YOU GOT MONEY TO BUY MORE.: NEVER TRUE

## 2024-07-10 SDOH — ECONOMIC STABILITY: FOOD INSECURITY: WITHIN THE PAST 12 MONTHS, THE FOOD YOU BOUGHT JUST DIDN'T LAST AND YOU DIDN'T HAVE MONEY TO GET MORE.: NEVER TRUE

## 2024-07-10 ASSESSMENT — ENCOUNTER SYMPTOMS
EYES NEGATIVE: 1
RESPIRATORY NEGATIVE: 1
GASTROINTESTINAL NEGATIVE: 1
ALLERGIC/IMMUNOLOGIC NEGATIVE: 1

## 2024-07-10 NOTE — PROGRESS NOTES
Name: Sarah Gregg  Date: 7/10/2024  YOB: 1945  LMP: No LMP recorded. Patient has had a hysterectomy.      Sarah is a 78 y.o.   who is here for a problem visit for vulva irritation, redness, and itching . This is her second visit for the same problem.  She has a history of VINIII    Sarah  reports that she is not currently sexually active. She reports using the following method of birth control/protection: Surgical.  Contraception: postmenopasual.   Menstrual status: none  Gyn Surgery: s/p TVH and laser of HORACIO III      Women's Health Timeline:  : BDS: nl      Pap History:  Diagnosis:   Date Value Ref Range Status   10/21/2013 Comment  Final     Comment:     NEGATIVE FOR INTRAEPITHELIAL LESION AND MALIGNANCY.        OB History:  OB History          2    Para   2    Term   2            AB        Living   2         SAB        IAB        Ectopic        Molar        Multiple        Live Births   2                 Medical History:  Past Medical History:  No date: Bursitis  No date: Eczema  No date: Endocrine disorder  No date: GERD (gastroesophageal reflux disease)  No date: Hypercholesterolemia  No date: Hypertension  No date: Menopause  No date: Mitral valve prolapse  No date: Scoliosis  No date: Urethral caruncle  : HORACIO III (vulvar intraepithelial neoplasia III)      Comment:  Laser treatment at 5:00 area; followed with Dr. Cordoba     Surgical History:  Past Surgical History:  2011: GYN      Comment:  laser of HORACIO III with Dr. Cordoba  1986: HYSTERECTOMY (CERVIX STATUS UNKNOWN)      Comment:  TVH (has both ovaries)     Meds:    Current Outpatient Medications:     estradiol (ESTRACE) 0.1 MG/GM vaginal cream, Use one gram 2 times per week in vagina and on vulva.  Use daily on caruncle., Disp: 42.5 g, Rfl: 3    NONFORMULARY, Garcia compounding cream (Patient not taking: Reported on 2024), Disp: , Rfl:     ketoconazole (NIZORAL) 2 % shampoo, , Disp: , Rfl:

## 2024-07-25 ENCOUNTER — TELEPHONE (OUTPATIENT)
Dept: OBGYN CLINIC | Age: 79
End: 2024-07-25

## 2024-07-25 RX ORDER — FLUCONAZOLE 150 MG/1
150 TABLET ORAL ONCE
Qty: 1 TABLET | Refills: 0 | Status: SHIPPED | OUTPATIENT
Start: 2024-07-25 | End: 2024-07-25

## 2024-07-25 NOTE — TELEPHONE ENCOUNTER
Returned pt's call. No answer, left VM informing pt I had responded to her concern in her [employer] message (same as message left on office VM).

## 2024-07-31 ENCOUNTER — OFFICE VISIT (OUTPATIENT)
Dept: OBGYN CLINIC | Age: 79
End: 2024-07-31

## 2024-07-31 VITALS
DIASTOLIC BLOOD PRESSURE: 86 MMHG | HEIGHT: 67 IN | WEIGHT: 160 LBS | BODY MASS INDEX: 25.11 KG/M2 | SYSTOLIC BLOOD PRESSURE: 144 MMHG

## 2024-07-31 DIAGNOSIS — L29.2 VULVAR ITCHING: Primary | ICD-10-CM

## 2024-07-31 DIAGNOSIS — N89.8 VAGINAL DISCHARGE: ICD-10-CM

## 2024-07-31 DIAGNOSIS — N89.8 VAGINAL IRRITATION: ICD-10-CM

## 2024-07-31 NOTE — PROGRESS NOTES
CC:   Chief Complaint   Patient presents with    vaginal irritation       HPI:    Sarah  is a 79 y.o. , , No LMP recorded. Patient has had a hysterectomy.,  who is seen today for continued vulvar itching. The patient was seen by Dr. Trivedi on  due to vulva irritation, redness, and itching. In reviewing MD's note, it was noted this was 2nd visit for same complaints and also has history of HORACIO III. After evaluation, she discussed sending referral to Dr. Cordoba with [facility] and did send in Lotrisone to assist with itching.     The patient states has been using Lotrisone cream that Dr. Trivedi sent in at previous visit and reports she has visit scheduled with Dr. Cordoba on .    She reports experiencing \"cottage cheese like\" vaginal discharge a week after seeing MD and also reports vaginal irritation. She states \"I also looked at the cream she sent in and it said for me not to use it more than 2 weeks so I stopped using it.\" She states \"when I feel the left outer edge it feels scaly to me.\" She denies fevers, chills, vaginal odor, urinary frequency, urgency or dysuria today.     Denies recent changes to soaps or laundry detergents.       Contraception: postmenopasual   s/p TVH and laser of HORACIO III ~13 years ago.     Not sexually active currently. No concerns for STDs-declines STD testing today.       GYN HISTORY:  As per HPI     Mammo: 2024: BI-RADS 2: Benign findings       Current Outpatient Medications on File Prior to Visit   Medication Sig Dispense Refill    clotrimazole-betamethasone (LOTRISONE) 1-0.05 % cream Apply topically 2 times daily. 45 g 2    estradiol (ESTRACE) 0.1 MG/GM vaginal cream Use one gram 2 times per week in vagina and on vulva.  Use daily on caruncle. 42.5 g 3    NONFORMULARY Garcia compounding cream      ketoconazole (NIZORAL) 2 % shampoo       magnesium (MAGNESIUM-OXIDE) 250 MG TABS tablet Take 1 tablet by mouth daily      metroNIDAZOLE (METROCREAM) 0.75 % cream daily

## 2024-08-05 ENCOUNTER — OFFICE VISIT (OUTPATIENT)
Dept: UROLOGY | Age: 79
End: 2024-08-05
Payer: MEDICARE

## 2024-08-05 DIAGNOSIS — N36.2 URETHRAL CARUNCLE: Primary | ICD-10-CM

## 2024-08-05 LAB
BILIRUBIN, URINE, POC: NEGATIVE
BLOOD URINE, POC: NORMAL
GLUCOSE URINE, POC: NEGATIVE
KETONES, URINE, POC: NEGATIVE
LEUKOCYTE ESTERASE, URINE, POC: NORMAL
NITRITE, URINE, POC: NEGATIVE
PH, URINE, POC: 6 (ref 4.6–8)
PROTEIN,URINE, POC: NEGATIVE
SPECIFIC GRAVITY, URINE, POC: 1.01 (ref 1–1.03)
URINALYSIS CLARITY, POC: NORMAL
URINALYSIS COLOR, POC: NORMAL
UROBILINOGEN, POC: NORMAL

## 2024-08-05 PROCEDURE — 1036F TOBACCO NON-USER: CPT | Performed by: NURSE PRACTITIONER

## 2024-08-05 PROCEDURE — 99213 OFFICE O/P EST LOW 20 MIN: CPT | Performed by: NURSE PRACTITIONER

## 2024-08-05 PROCEDURE — 1090F PRES/ABSN URINE INCON ASSESS: CPT | Performed by: NURSE PRACTITIONER

## 2024-08-05 PROCEDURE — 81003 URINALYSIS AUTO W/O SCOPE: CPT | Performed by: NURSE PRACTITIONER

## 2024-08-05 PROCEDURE — G8399 PT W/DXA RESULTS DOCUMENT: HCPCS | Performed by: NURSE PRACTITIONER

## 2024-08-05 PROCEDURE — G8428 CUR MEDS NOT DOCUMENT: HCPCS | Performed by: NURSE PRACTITIONER

## 2024-08-05 PROCEDURE — G8419 CALC BMI OUT NRM PARAM NOF/U: HCPCS | Performed by: NURSE PRACTITIONER

## 2024-08-05 PROCEDURE — 1123F ACP DISCUSS/DSCN MKR DOCD: CPT | Performed by: NURSE PRACTITIONER

## 2024-08-05 ASSESSMENT — ENCOUNTER SYMPTOMS
BACK PAIN: 0
NAUSEA: 0

## 2024-08-05 NOTE — PROGRESS NOTES
[facility]  [address] [place] [id number]  [phone]          Sarah Gregg  : 1945    Chief Complaint   Patient presents with    Follow-up          HPI     Sarah Gregg is a 79 y.o. female    Returns today for follow-up on urethral caruncle and feeling of urinary infection.  Patient continues to use Estrace cream as prescribed.  She is without any urinary symptoms today. From a urinary standpoint she has been doing well.  Her urine is clear today.She will need refills on Estrace     She is pleased with her urinary status.  Is getting up once a night now to void however reviewing her medication she is taking the new medication and bedtime and needing to drink water with this.     She saw Dr. Lopez last year after her visit with me to discuss renal cyst.  She does not need any follow up Imaging on that.       Past Medical History:   Diagnosis Date    Bursitis     Eczema     Endocrine disorder     GERD (gastroesophageal reflux disease)     Hypercholesterolemia     Hypertension     Menopause     Mitral valve prolapse     Scoliosis     Urethral caruncle     HORACIO III (vulvar intraepithelial neoplasia III)     Laser treatment at 5:00 area; followed with Dr. Cordoba     Past Surgical History:   Procedure Laterality Date    GYN  2011    laser of HORACIO III with Dr. Cordoba    HYSTERECTOMY (CERVIX STATUS UNKNOWN)      TVH (has both ovaries)     Current Outpatient Medications   Medication Sig Dispense Refill    clotrimazole-betamethasone (LOTRISONE) 1-0.05 % cream Apply topically 2 times daily. 45 g 2    estradiol (ESTRACE) 0.1 MG/GM vaginal cream Use one gram 2 times per week in vagina and on vulva.  Use daily on caruncle. 42.5 g 3    NONFORMULARY Garcia compounding cream      ketoconazole (NIZORAL) 2 % shampoo       magnesium (MAGNESIUM-OXIDE) 250 MG TABS tablet Take 1 tablet by mouth daily      metroNIDAZOLE (METROCREAM) 0.75 % cream daily      montelukast (SINGULAIR)

## 2024-08-06 LAB
A VAGINAE DNA VAG QL NAA+PROBE: NORMAL SCORE
BVAB2 DNA VAG QL NAA+PROBE: NORMAL SCORE
C ALBICANS DNA VAG QL NAA+PROBE: NEGATIVE
C GLABRATA DNA VAG QL NAA+PROBE: NEGATIVE
MEGA1 DNA VAG QL NAA+PROBE: NORMAL SCORE
SPECIMEN SOURCE: NORMAL

## 2024-12-04 ENCOUNTER — OFFICE VISIT (OUTPATIENT)
Dept: OBGYN CLINIC | Age: 79
End: 2024-12-04
Payer: MEDICARE

## 2024-12-04 VITALS
HEIGHT: 67 IN | SYSTOLIC BLOOD PRESSURE: 144 MMHG | DIASTOLIC BLOOD PRESSURE: 78 MMHG | WEIGHT: 160 LBS | BODY MASS INDEX: 25.11 KG/M2

## 2024-12-04 DIAGNOSIS — B02.22 TRIGEMINAL HERPES ZOSTER: ICD-10-CM

## 2024-12-04 DIAGNOSIS — N63.11 MASS OF UPPER OUTER QUADRANT OF RIGHT BREAST: Primary | ICD-10-CM

## 2024-12-04 PROCEDURE — 1036F TOBACCO NON-USER: CPT | Performed by: OBSTETRICS & GYNECOLOGY

## 2024-12-04 PROCEDURE — 1159F MED LIST DOCD IN RCRD: CPT | Performed by: OBSTETRICS & GYNECOLOGY

## 2024-12-04 PROCEDURE — G8399 PT W/DXA RESULTS DOCUMENT: HCPCS | Performed by: OBSTETRICS & GYNECOLOGY

## 2024-12-04 PROCEDURE — 99213 OFFICE O/P EST LOW 20 MIN: CPT | Performed by: OBSTETRICS & GYNECOLOGY

## 2024-12-04 PROCEDURE — 1160F RVW MEDS BY RX/DR IN RCRD: CPT | Performed by: OBSTETRICS & GYNECOLOGY

## 2024-12-04 PROCEDURE — 1090F PRES/ABSN URINE INCON ASSESS: CPT | Performed by: OBSTETRICS & GYNECOLOGY

## 2024-12-04 PROCEDURE — G8427 DOCREV CUR MEDS BY ELIG CLIN: HCPCS | Performed by: OBSTETRICS & GYNECOLOGY

## 2024-12-04 PROCEDURE — 1123F ACP DISCUSS/DSCN MKR DOCD: CPT | Performed by: OBSTETRICS & GYNECOLOGY

## 2024-12-04 PROCEDURE — G8419 CALC BMI OUT NRM PARAM NOF/U: HCPCS | Performed by: OBSTETRICS & GYNECOLOGY

## 2024-12-04 PROCEDURE — G8484 FLU IMMUNIZE NO ADMIN: HCPCS | Performed by: OBSTETRICS & GYNECOLOGY

## 2024-12-04 ASSESSMENT — PATIENT HEALTH QUESTIONNAIRE - PHQ9
2. FEELING DOWN, DEPRESSED OR HOPELESS: NOT AT ALL
SUM OF ALL RESPONSES TO PHQ QUESTIONS 1-9: 0
1. LITTLE INTEREST OR PLEASURE IN DOING THINGS: NOT AT ALL
SUM OF ALL RESPONSES TO PHQ9 QUESTIONS 1 & 2: 0
SUM OF ALL RESPONSES TO PHQ QUESTIONS 1-9: 0

## 2024-12-04 NOTE — PROGRESS NOTES
Name: Sarah Gregg  Date: 2024  YOB: 1945  LMP: No LMP recorded. Patient has had a hysterectomy.      Sarah is a 79 y.o.   who is here for a problem visit for R breast lump/pain x 3-4 weeks . She developed shingles about 3 weeks ago     Sarah  reports that she is not currently sexually active. She reports using the following method of birth control/protection: Surgical.  Contraception: status post hysterectomy.   Menstrual status: none  Gyn Surgery: s/p TVH and laser of HORACIO III      Women's Health Timeline:  : BDS: nl      Pap History:  Diagnosis:   Date Value Ref Range Status   10/21/2013 Comment  Final     Comment:     NEGATIVE FOR INTRAEPITHELIAL LESION AND MALIGNANCY.        OB History:  OB History          2    Para   2    Term   2            AB        Living   2         SAB        IAB        Ectopic        Molar        Multiple        Live Births   2                 Medical History:  Past Medical History:  No date: Bursitis  No date: Eczema  No date: Endocrine disorder  No date: GERD (gastroesophageal reflux disease)  No date: Hypercholesterolemia  No date: Hypertension  No date: Menopause  No date: Mitral valve prolapse  No date: Scoliosis  No date: Urethral caruncle  : HORACIO III (vulvar intraepithelial neoplasia III)      Comment:  Laser treatment at 5:00 area; followed with Dr. Cordoba     Surgical History:  Past Surgical History:  : GYN      Comment:  laser of HORACIO III with Dr. Cordoba  1986: HYSTERECTOMY (CERVIX STATUS UNKNOWN)      Comment:  TVH (has both ovaries)     Meds:    Current Outpatient Medications:     clotrimazole-betamethasone (LOTRISONE) 1-0.05 % cream, Apply topically 2 times daily., Disp: 45 g, Rfl: 2    estradiol (ESTRACE) 0.1 MG/GM vaginal cream, Use one gram 2 times per week in vagina and on vulva.  Use daily on caruncle., Disp: 42.5 g, Rfl: 3    NONFORMULARY, [employer] compounding cream, Disp: , Rfl:     ketoconazole (NIZORAL) 2 %

## 2024-12-17 ENCOUNTER — HOSPITAL ENCOUNTER (OUTPATIENT)
Dept: MAMMOGRAPHY | Age: 79
Discharge: HOME | End: 2024-12-20
Attending: OBSTETRICS & GYNECOLOGY
Payer: MEDICARE

## 2024-12-17 DIAGNOSIS — N63.11 MASS OF UPPER OUTER QUADRANT OF RIGHT BREAST: ICD-10-CM

## 2024-12-17 PROCEDURE — G0279 TOMOSYNTHESIS, MAMMO: HCPCS

## 2024-12-17 PROCEDURE — 76642 ULTRASOUND BREAST LIMITED: CPT

## 2025-02-19 ENCOUNTER — OFFICE VISIT (OUTPATIENT)
Dept: OBGYN CLINIC | Age: 80
End: 2025-02-19
Payer: MEDICARE

## 2025-02-19 VITALS
HEIGHT: 67 IN | DIASTOLIC BLOOD PRESSURE: 80 MMHG | SYSTOLIC BLOOD PRESSURE: 140 MMHG | WEIGHT: 160 LBS | BODY MASS INDEX: 25.11 KG/M2

## 2025-02-19 DIAGNOSIS — Z01.419 WELL WOMAN EXAM WITH ROUTINE GYNECOLOGICAL EXAM: Primary | ICD-10-CM

## 2025-02-19 DIAGNOSIS — Z12.31 ENCOUNTER FOR SCREENING MAMMOGRAM FOR MALIGNANT NEOPLASM OF BREAST: ICD-10-CM

## 2025-02-19 PROCEDURE — G8419 CALC BMI OUT NRM PARAM NOF/U: HCPCS | Performed by: OBSTETRICS & GYNECOLOGY

## 2025-02-19 PROCEDURE — 1160F RVW MEDS BY RX/DR IN RCRD: CPT | Performed by: OBSTETRICS & GYNECOLOGY

## 2025-02-19 PROCEDURE — 1036F TOBACCO NON-USER: CPT | Performed by: OBSTETRICS & GYNECOLOGY

## 2025-02-19 PROCEDURE — 1090F PRES/ABSN URINE INCON ASSESS: CPT | Performed by: OBSTETRICS & GYNECOLOGY

## 2025-02-19 PROCEDURE — 1123F ACP DISCUSS/DSCN MKR DOCD: CPT | Performed by: OBSTETRICS & GYNECOLOGY

## 2025-02-19 PROCEDURE — G8399 PT W/DXA RESULTS DOCUMENT: HCPCS | Performed by: OBSTETRICS & GYNECOLOGY

## 2025-02-19 PROCEDURE — 1159F MED LIST DOCD IN RCRD: CPT | Performed by: OBSTETRICS & GYNECOLOGY

## 2025-02-19 PROCEDURE — G8427 DOCREV CUR MEDS BY ELIG CLIN: HCPCS | Performed by: OBSTETRICS & GYNECOLOGY

## 2025-02-19 PROCEDURE — 99213 OFFICE O/P EST LOW 20 MIN: CPT | Performed by: OBSTETRICS & GYNECOLOGY

## 2025-02-19 RX ORDER — ESTRADIOL 0.1 MG/G
CREAM VAGINAL
Qty: 42.5 G | Refills: 3 | Status: SHIPPED | OUTPATIENT
Start: 2025-02-19

## 2025-02-19 RX ORDER — ESTRADIOL 0.1 MG/G
CREAM VAGINAL
Qty: 42.5 G | Refills: 3 | Status: SHIPPED | OUTPATIENT
Start: 2025-02-19 | End: 2025-02-19 | Stop reason: SDUPTHER

## 2025-02-19 NOTE — TELEPHONE ENCOUNTER
TC from pt stating she needed her estrace cream sent to an alternate pharmacy.  Pharmacy entered but medication is triggering a warning due to her allergy to parabens (hives).  Routed to Dr Trivedi to confirm patient can take this medication.    Per Dr Trivedi, pt is ok to take medication (she states she has been using it in the past with no complaints).  Rx sent.

## 2025-02-19 NOTE — PROGRESS NOTES
Name: Sarah Gregg  Date: 2025  YOB: 1945  LMP: No LMP recorded. Patient has had a hysterectomy.      Sarah is a 79 y.o.   who is here for a problem visit: vaginal dryness, irritation.     Sarah  reports that she is not currently sexually active. She reports using the following method of birth control/protection: Surgical.  Contraception: status post hysterectomy.   Menstrual status: none  Gyn Surgery:  [facility]      Women's Health Timeline:  : nl breast us  L mammo cat 1, #BDA  : BDS: nl      Pap History:  Diagnosis:   Date Value Ref Range Status   10/21/2013 Comment  Final     Comment:     NEGATIVE FOR INTRAEPITHELIAL LESION AND MALIGNANCY.        OB History:  OB History          2    Para   2    Term   2            AB        Living   2         SAB        IAB        Ectopic        Molar        Multiple        Live Births   2                 Medical History:  Past Medical History:  No date: Bursitis  No date: Eczema  No date: Endocrine disorder  No date: GERD (gastroesophageal reflux disease)  No date: Hypercholesterolemia  No date: Hypertension  No date: Menopause  No date: Mitral valve prolapse  No date: Scoliosis  No date: Urethral caruncle  : HORACIO III (vulvar intraepithelial neoplasia III)      Comment:  Laser treatment at 5:00 area; followed with Dr. Cordoba     Surgical History:  Past Surgical History:  2011: GYN      Comment:  laser of HORACIO III with Dr. Cordoba  1986: HYSTERECTOMY (CERVIX STATUS UNKNOWN)      Comment:  TVH (has both ovaries)     Meds:    Current Outpatient Medications:     clotrimazole-betamethasone (LOTRISONE) 1-0.05 % cream, Apply topically 2 times daily., Disp: 45 g, Rfl: 2    estradiol (ESTRACE) 0.1 MG/GM vaginal cream, Use one gram 2 times per week in vagina and on vulva.  Use daily on caruncle., Disp: 42.5 g, Rfl: 3    NONFORMULARY, Garcia compounding cream, Disp: , Rfl:     ketoconazole (NIZORAL) 2 % shampoo, , Disp: , Rfl:

## 2025-02-28 ENCOUNTER — OFFICE VISIT (OUTPATIENT)
Dept: ORTHOPEDIC SURGERY | Age: 80
End: 2025-02-28

## 2025-02-28 DIAGNOSIS — M25.561 RIGHT KNEE PAIN, UNSPECIFIED CHRONICITY: Primary | ICD-10-CM

## 2025-02-28 NOTE — PROGRESS NOTES
Name: Sarah Gregg  YOB: 1945  Gender: female  MRN: [id number]    CC:   Chief Complaint   Patient presents with    Knee Pain     Right knee-getting xrays today         DIAGNOSIS:   Encounter Diagnosis   Name Primary?    Right knee pain, unspecified chronicity Yes        HPI:   The pain has been present for 4 weeks and is becoming less with discomfort  It hurts not too much at night when sleeping.  The pain is located over the knee.  It does hurt to walk and gets worse with increased distances.   The pain does not radiate down the leg.  Numbness and tingling are not noted.   Treatment so far has been Voltaren gel.  She is allergic to Advil or Aleve.      Current Outpatient Medications:     estradiol (ESTRACE) 0.1 MG/GM vaginal cream, Use one gram 2 times per week in vagina and on vulva.  Use daily on caruncle., Disp: 42.5 g, Rfl: 3    clotrimazole-betamethasone (LOTRISONE) 1-0.05 % cream, Apply topically 2 times daily., Disp: 45 g, Rfl: 2    NONFORMULARY, [employer] compounding cream, Disp: , Rfl:     ketoconazole (NIZORAL) 2 % shampoo, , Disp: , Rfl:     magnesium (MAGNESIUM-OXIDE) 250 MG TABS tablet, Take 1 tablet by mouth daily, Disp: , Rfl:     metroNIDAZOLE (METROCREAM) 0.75 % cream, daily, Disp: , Rfl:     montelukast (SINGULAIR) 10 MG tablet, Take 1 tablet by mouth nightly, Disp: , Rfl:     olopatadine (PATANOL) 0.1 % ophthalmic solution, 1 drop once as needed, Disp: , Rfl:     diclofenac sodium (VOLTAREN) 1 % GEL, Apply 4 g topically 4 times daily as needed for Pain, Disp: 150 g, Rfl: 2    azelastine (ASTELIN) 0.1 % nasal spray, , Disp: , Rfl:     acyclovir (ZOVIRAX) 5 % CREA, Apply topically 5 times daily, Disp: , Rfl:     ascorbic acid (VITAMIN C) 250 MG tablet, Take by mouth, Disp: , Rfl:     betamethasone dipropionate (DIPROLENE) 0.05 % ointment, Apply topically 2 times daily, Disp: , Rfl:     Calcium Carbonate-Vitamin D (CALCIUM-VITAMIN D) 600-125 MG-UNIT TABS, Take by mouth, Disp: ,

## 2025-03-03 ENCOUNTER — TELEPHONE (OUTPATIENT)
Dept: ORTHOPEDIC SURGERY | Age: 80
End: 2025-03-03

## 2025-03-03 NOTE — TELEPHONE ENCOUNTER
Had CT scan done and was told to call # [phone] to schedule a apt but can't get anyone, is confused on where she is calling and who she needs to schedule apt with, wants to talk to Rosaura

## 2025-03-10 ENCOUNTER — OFFICE VISIT (OUTPATIENT)
Dept: ORTHOPEDIC SURGERY | Age: 80
End: 2025-03-10

## 2025-03-10 DIAGNOSIS — S82.122D CLOSED FRACTURE OF LATERAL PORTION OF LEFT TIBIAL PLATEAU WITH ROUTINE HEALING, SUBSEQUENT ENCOUNTER: ICD-10-CM

## 2025-03-10 DIAGNOSIS — S82.141A TIBIAL PLATEAU FRACTURE, RIGHT, CLOSED, INITIAL ENCOUNTER: ICD-10-CM

## 2025-03-10 DIAGNOSIS — M25.561 RIGHT KNEE PAIN, UNSPECIFIED CHRONICITY: Primary | ICD-10-CM

## 2025-03-10 NOTE — PROGRESS NOTES
In today for follow-up discussion of the CAT scan.  It indeed revealed a lateral plateau fracture with no significant displacement.  She is now 5 weeks postinjury.  She is up and mobile without any support devices.  She does complain of some tightness in elbow swelling in the right extremity.  She is also had a trochanteric bursal concern that she has been unable to condition through.  She feels like she has been set back with the fracture.    On exam she really has just a trace bit of boggy synovitis and no pain on terminal limits of motion no instability.    Review of the CAT scan reveals a plateau fracture on the lateral side.    Were going to go ahead and allow her to be up and functional as she is 5 weeks postinjury and swelling is down and she is not unstable.  Will have her work on physical therapy and conditioning exercises the next few weeks on her own.  If she is discouraged she will call over arrange for her to see a therapist 2 or 3 times a week.  She will plan on seeing me back in 4 weeks at time is get AP lateral and oblique views of the right tibia.    Because of the swelling in her extremity organ to rule out a DVT and get ultrasound.  Would expect this to be negative as she has been up and functional and not too immobile

## 2025-04-18 ENCOUNTER — OFFICE VISIT (OUTPATIENT)
Dept: ORTHOPEDIC SURGERY | Age: 80
End: 2025-04-18

## 2025-04-18 DIAGNOSIS — M25.561 RIGHT KNEE PAIN, UNSPECIFIED CHRONICITY: Primary | ICD-10-CM

## 2025-04-18 RX ORDER — TRIAMCINOLONE ACETONIDE 40 MG/ML
40 INJECTION, SUSPENSION INTRA-ARTICULAR; INTRAMUSCULAR ONCE
Status: COMPLETED | OUTPATIENT
Start: 2025-04-18 | End: 2025-04-18

## 2025-04-18 RX ADMIN — TRIAMCINOLONE ACETONIDE 40 MG: 40 INJECTION, SUSPENSION INTRA-ARTICULAR; INTRAMUSCULAR at 10:57

## 2025-04-18 NOTE — PROGRESS NOTES
Name: Sarah Gregg  YOB: 1945  Gender: female  MRN: [id number]      Current Outpatient Medications:     estradiol (ESTRACE) 0.1 MG/GM vaginal cream, Use one gram 2 times per week in vagina and on vulva.  Use daily on caruncle., Disp: 42.5 g, Rfl: 3    clotrimazole-betamethasone (LOTRISONE) 1-0.05 % cream, Apply topically 2 times daily., Disp: 45 g, Rfl: 2    NONFORMULARY, Garcia compounding cream, Disp: , Rfl:     ketoconazole (NIZORAL) 2 % shampoo, , Disp: , Rfl:     magnesium (MAGNESIUM-OXIDE) 250 MG TABS tablet, Take 1 tablet by mouth daily, Disp: , Rfl:     metroNIDAZOLE (METROCREAM) 0.75 % cream, daily, Disp: , Rfl:     montelukast (SINGULAIR) 10 MG tablet, Take 1 tablet by mouth nightly, Disp: , Rfl:     olopatadine (PATANOL) 0.1 % ophthalmic solution, 1 drop once as needed, Disp: , Rfl:     diclofenac sodium (VOLTAREN) 1 % GEL, Apply 4 g topically 4 times daily as needed for Pain, Disp: 150 g, Rfl: 2    azelastine (ASTELIN) 0.1 % nasal spray, , Disp: , Rfl:     acyclovir (ZOVIRAX) 5 % CREA, Apply topically 5 times daily, Disp: , Rfl:     ascorbic acid (VITAMIN C) 250 MG tablet, Take by mouth, Disp: , Rfl:     betamethasone dipropionate (DIPROLENE) 0.05 % ointment, Apply topically 2 times daily, Disp: , Rfl:     Calcium Carbonate-Vitamin D (CALCIUM-VITAMIN D) 600-125 MG-UNIT TABS, Take by mouth, Disp: , Rfl:     cycloSPORINE (RESTASIS) 0.05 % ophthalmic emulsion, Apply 1 drop to eye 2 times daily, Disp: , Rfl:     levocetirizine (XYZAL) 5 MG tablet, Take by mouth, Disp: , Rfl:     losartan (COZAAR) 100 MG tablet, Take 1 tablet by mouth daily, Disp: , Rfl:     triamcinolone (KENALOG) 0.1 % cream, Apply topically 2 times daily, Disp: , Rfl:   Allergies   Allergen Reactions    Latex Hives    Methylprednisolone Other (See Comments) and Shortness Of Breath     flushing    Peanut-Containing Drug Products      Other reaction(s): breathing issues   CASHEWS ONLY    Shellfish-Derived Products

## 2025-04-30 ENCOUNTER — HOSPITAL ENCOUNTER (OUTPATIENT)
Dept: MAMMOGRAPHY | Age: 80
Discharge: HOME | End: 2025-05-03
Attending: OBSTETRICS & GYNECOLOGY
Payer: MEDICARE

## 2025-04-30 VITALS — BODY MASS INDEX: 25.11 KG/M2 | HEIGHT: 67 IN | WEIGHT: 160 LBS

## 2025-04-30 DIAGNOSIS — Z12.31 ENCOUNTER FOR SCREENING MAMMOGRAM FOR MALIGNANT NEOPLASM OF BREAST: ICD-10-CM

## 2025-04-30 PROCEDURE — 77063 BREAST TOMOSYNTHESIS BI: CPT

## 2025-06-23 ENCOUNTER — OFFICE VISIT (OUTPATIENT)
Dept: ORTHOPEDIC SURGERY | Age: 80
End: 2025-06-23
Payer: MEDICARE

## 2025-06-23 DIAGNOSIS — M25.561 RIGHT KNEE PAIN, UNSPECIFIED CHRONICITY: ICD-10-CM

## 2025-06-23 DIAGNOSIS — M17.11 OSTEOARTHRITIS OF RIGHT KNEE, UNSPECIFIED OSTEOARTHRITIS TYPE: Primary | ICD-10-CM

## 2025-06-23 PROCEDURE — G8419 CALC BMI OUT NRM PARAM NOF/U: HCPCS | Performed by: PHYSICIAN ASSISTANT

## 2025-06-23 PROCEDURE — 99213 OFFICE O/P EST LOW 20 MIN: CPT | Performed by: PHYSICIAN ASSISTANT

## 2025-06-23 PROCEDURE — 1090F PRES/ABSN URINE INCON ASSESS: CPT | Performed by: PHYSICIAN ASSISTANT

## 2025-06-23 PROCEDURE — G8428 CUR MEDS NOT DOCUMENT: HCPCS | Performed by: PHYSICIAN ASSISTANT

## 2025-06-23 PROCEDURE — G8399 PT W/DXA RESULTS DOCUMENT: HCPCS | Performed by: PHYSICIAN ASSISTANT

## 2025-06-23 PROCEDURE — 1036F TOBACCO NON-USER: CPT | Performed by: PHYSICIAN ASSISTANT

## 2025-06-23 PROCEDURE — 1123F ACP DISCUSS/DSCN MKR DOCD: CPT | Performed by: PHYSICIAN ASSISTANT

## 2025-06-23 NOTE — PROGRESS NOTES
Name: Sarah Gregg  YOB: 1945  Gender: female  MRN: [id number]    CC:   Chief Complaint   Patient presents with    Follow-up     S/P Right knee cortisone 4/18- if no relief possible PT        HPI:  The right knee pain has been moderate relieved with the cortisone injection. The constant ache is gone.  Function is improved.  She still experiencing some soreness about the right knee and hamstrings region.  She denies any appreciable knee joint swelling.  She sustained a right nondisplaced tibial plateau fracture from a fall in February.  No other new complaints.    ROS:  As per HPI. Pertinent postives and negatives are addressed with the patient, particularly those related to musculoskeletal concerns.  Non-orthopaedic concerns were referred back to the primary care physician.      PMFSH:    Current Outpatient Medications:     estradiol (ESTRACE) 0.1 MG/GM vaginal cream, Use one gram 2 times per week in vagina and on vulva.  Use daily on caruncle., Disp: 42.5 g, Rfl: 3    clotrimazole-betamethasone (LOTRISONE) 1-0.05 % cream, Apply topically 2 times daily., Disp: 45 g, Rfl: 2    NONFORMULARY, [employer] compounding cream, Disp: , Rfl:     ketoconazole (NIZORAL) 2 % shampoo, , Disp: , Rfl:     magnesium (MAGNESIUM-OXIDE) 250 MG TABS tablet, Take 1 tablet by mouth daily, Disp: , Rfl:     metroNIDAZOLE (METROCREAM) 0.75 % cream, daily, Disp: , Rfl:     montelukast (SINGULAIR) 10 MG tablet, Take 1 tablet by mouth nightly, Disp: , Rfl:     olopatadine (PATANOL) 0.1 % ophthalmic solution, 1 drop once as needed, Disp: , Rfl:     diclofenac sodium (VOLTAREN) 1 % GEL, Apply 4 g topically 4 times daily as needed for Pain, Disp: 150 g, Rfl: 2    azelastine (ASTELIN) 0.1 % nasal spray, , Disp: , Rfl:     acyclovir (ZOVIRAX) 5 % CREA, Apply topically 5 times daily, Disp: , Rfl:     ascorbic acid (VITAMIN C) 250 MG tablet, Take by mouth, Disp: , Rfl:     betamethasone dipropionate (DIPROLENE) 0.05 % ointment, Apply

## 2025-08-06 ENCOUNTER — OFFICE VISIT (OUTPATIENT)
Dept: UROLOGY | Age: 80
End: 2025-08-06
Payer: MEDICARE

## 2025-08-06 DIAGNOSIS — N36.2 URETHRAL CARUNCLE: Primary | ICD-10-CM

## 2025-08-06 LAB
BILIRUBIN, URINE, POC: NEGATIVE
BLOOD URINE, POC: NEGATIVE
GLUCOSE URINE, POC: NEGATIVE MG/DL
KETONES, URINE, POC: NEGATIVE MG/DL
LEUKOCYTE ESTERASE, URINE, POC: NEGATIVE
NITRITE, URINE, POC: NEGATIVE
PH, URINE, POC: 6.5 (ref 4.6–8)
PROTEIN,URINE, POC: NEGATIVE MG/DL
SPECIFIC GRAVITY, URINE, POC: 1.01 (ref 1–1.03)
URINALYSIS CLARITY, POC: NORMAL
URINALYSIS COLOR, POC: NORMAL
UROBILINOGEN, POC: NORMAL MG/DL

## 2025-08-06 PROCEDURE — 1036F TOBACCO NON-USER: CPT | Performed by: NURSE PRACTITIONER

## 2025-08-06 PROCEDURE — 99213 OFFICE O/P EST LOW 20 MIN: CPT | Performed by: NURSE PRACTITIONER

## 2025-08-06 PROCEDURE — G8428 CUR MEDS NOT DOCUMENT: HCPCS | Performed by: NURSE PRACTITIONER

## 2025-08-06 PROCEDURE — 1090F PRES/ABSN URINE INCON ASSESS: CPT | Performed by: NURSE PRACTITIONER

## 2025-08-06 PROCEDURE — 1123F ACP DISCUSS/DSCN MKR DOCD: CPT | Performed by: NURSE PRACTITIONER

## 2025-08-06 PROCEDURE — 81003 URINALYSIS AUTO W/O SCOPE: CPT | Performed by: NURSE PRACTITIONER

## 2025-08-06 PROCEDURE — G8419 CALC BMI OUT NRM PARAM NOF/U: HCPCS | Performed by: NURSE PRACTITIONER

## 2025-08-06 PROCEDURE — G8399 PT W/DXA RESULTS DOCUMENT: HCPCS | Performed by: NURSE PRACTITIONER

## 2025-08-06 RX ORDER — ESTRADIOL 0.1 MG/G
CREAM VAGINAL
Qty: 42.5 G | Refills: 11 | Status: SHIPPED | OUTPATIENT
Start: 2025-08-06

## 2025-08-06 ASSESSMENT — ENCOUNTER SYMPTOMS
NAUSEA: 0
BACK PAIN: 0

## 2025-08-11 RX ORDER — ESTRADIOL 0.1 MG/G
CREAM VAGINAL
Qty: 42.5 G | Refills: 11 | Status: SHIPPED | OUTPATIENT
Start: 2025-08-11